# Patient Record
Sex: MALE | Race: OTHER | URBAN - METROPOLITAN AREA
[De-identification: names, ages, dates, MRNs, and addresses within clinical notes are randomized per-mention and may not be internally consistent; named-entity substitution may affect disease eponyms.]

---

## 2021-08-28 ENCOUNTER — TELEPHONE ENCOUNTER (OUTPATIENT)
Dept: URBAN - METROPOLITAN AREA CLINIC 13 | Facility: CLINIC | Age: 86
End: 2021-08-28

## 2021-08-29 ENCOUNTER — TELEPHONE ENCOUNTER (OUTPATIENT)
Dept: URBAN - METROPOLITAN AREA CLINIC 13 | Facility: CLINIC | Age: 86
End: 2021-08-29

## 2022-05-09 ENCOUNTER — OFFICE VISIT (OUTPATIENT)
Dept: URBAN - NONMETROPOLITAN AREA CLINIC 2 | Facility: CLINIC | Age: 87
End: 2022-05-09

## 2022-12-20 ENCOUNTER — OFFICE VISIT (OUTPATIENT)
Dept: URBAN - METROPOLITAN AREA CLINIC 115 | Facility: CLINIC | Age: 87
End: 2022-12-20

## 2022-12-21 ENCOUNTER — OFFICE VISIT (OUTPATIENT)
Dept: URBAN - METROPOLITAN AREA CLINIC 115 | Facility: CLINIC | Age: 87
End: 2022-12-21
Payer: MEDICARE

## 2022-12-21 VITALS
BODY MASS INDEX: 21.34 KG/M2 | WEIGHT: 125 LBS | HEART RATE: 62 BPM | HEIGHT: 64 IN | SYSTOLIC BLOOD PRESSURE: 140 MMHG | TEMPERATURE: 98.2 F | DIASTOLIC BLOOD PRESSURE: 68 MMHG

## 2022-12-21 DIAGNOSIS — R19.7 DIARRHEA, UNSPECIFIED TYPE: ICD-10-CM

## 2022-12-21 PROCEDURE — 99204 OFFICE O/P NEW MOD 45 MIN: CPT | Performed by: INTERNAL MEDICINE

## 2022-12-21 RX ORDER — ACETAMINOPHEN 325 MG/1
1 TABLET AS NEEDED TABLET ORAL
Status: ACTIVE | COMMUNITY

## 2022-12-21 RX ORDER — MULTIVIT-MIN/IRON/FOLIC ACID/K 18-600-40
AS DIRECTED CAPSULE ORAL
Status: ACTIVE | COMMUNITY

## 2022-12-21 RX ORDER — NICOTINE 14MG/24HR
AS DIRECTED PATCH, TRANSDERMAL 24 HOURS TRANSDERMAL
Status: ACTIVE | COMMUNITY

## 2022-12-21 RX ORDER — GABAPENTIN 300 MG/1
1 CAPSULE CAPSULE ORAL ONCE A DAY
Status: ACTIVE | COMMUNITY

## 2022-12-21 RX ORDER — LOPERAMIDE HCL 2 MG
1 CAPSULE AS NEEDED CAPSULE ORAL
Status: ACTIVE | COMMUNITY

## 2022-12-21 NOTE — HPI-TODAY'S VISIT:
Mr. Cabral is a 89year-old male came along with his daughter for evaluation of having severe diarrhea.  Patient stated on his life he has episodes of loose stools often associated with urgency however symptoms has gotten worse in the past few years.  He has seen several GIs in the past and been taking over-the-counter Imodium without any relief.  Patient reports colonoscopy was recommended however because of his age she was hesitant.  Patient also reports having losing weight over the past several years and lost about at least 50 pounds. Patient denies any nausea vomiting blood in the stools.  He reports having occasional abdominal cramping improves after having a bowel movement.  Symptoms Definitely anything greasy and dairy can cause more diarrhea than usual years along with some bloating and discomfort.  Patient was also diagnosed with the neuropathy and he reports having intermittent negative work-up for the neuropathy reports normal B12 levels.  I do not have any records available.  Patient reports having colonoscopy in 2016.

## 2022-12-21 NOTE — PHYSICAL EXAM CONSTITUTIONAL:
thin frial male  , in no acute distress , ambulating without difficulty using cane normal communication ability

## 2023-01-05 ENCOUNTER — LAB OUTSIDE AN ENCOUNTER (OUTPATIENT)
Dept: URBAN - METROPOLITAN AREA CLINIC 82 | Facility: CLINIC | Age: 88
End: 2023-01-05

## 2023-01-05 ENCOUNTER — TELEPHONE ENCOUNTER (OUTPATIENT)
Dept: URBAN - METROPOLITAN AREA CLINIC 115 | Facility: CLINIC | Age: 88
End: 2023-01-05

## 2023-01-11 LAB
CALPROTECTIN, STOOL - QDX: (no result)
FECAL FAT, QUALITATIVE: (no result)
GASTROINTESTINAL PATHOGEN: (no result)
PANCREATICELASTASE ELISA, STOOL: (no result)

## 2023-01-23 ENCOUNTER — OFFICE VISIT (OUTPATIENT)
Dept: URBAN - METROPOLITAN AREA TELEHEALTH 2 | Facility: TELEHEALTH | Age: 88
End: 2023-01-23
Payer: MEDICARE

## 2023-01-23 ENCOUNTER — DASHBOARD ENCOUNTERS (OUTPATIENT)
Age: 88
End: 2023-01-23

## 2023-01-23 ENCOUNTER — TELEPHONE ENCOUNTER (OUTPATIENT)
Dept: URBAN - METROPOLITAN AREA CLINIC 92 | Facility: CLINIC | Age: 88
End: 2023-01-23

## 2023-01-23 VITALS — HEIGHT: 64 IN | BODY MASS INDEX: 22.2 KG/M2 | WEIGHT: 130 LBS

## 2023-01-23 DIAGNOSIS — E73.9 LACTOSE INTOLERANCE: ICD-10-CM

## 2023-01-23 DIAGNOSIS — R19.7 DIARRHEA, UNSPECIFIED TYPE: ICD-10-CM

## 2023-01-23 PROBLEM — 782415009: Status: ACTIVE | Noted: 2023-01-23

## 2023-01-23 PROCEDURE — 99214 OFFICE O/P EST MOD 30 MIN: CPT | Performed by: INTERNAL MEDICINE

## 2023-01-23 RX ORDER — NICOTINE 14MG/24HR
AS DIRECTED PATCH, TRANSDERMAL 24 HOURS TRANSDERMAL
Status: ACTIVE | COMMUNITY

## 2023-01-23 RX ORDER — MULTIVIT-MIN/IRON/FOLIC ACID/K 18-600-40
AS DIRECTED CAPSULE ORAL
Status: ACTIVE | COMMUNITY

## 2023-01-23 RX ORDER — GABAPENTIN 300 MG/1
1 CAPSULE CAPSULE ORAL ONCE A DAY
Status: ACTIVE | COMMUNITY

## 2023-01-23 RX ORDER — ACETAMINOPHEN 325 MG/1
1 TABLET AS NEEDED TABLET ORAL
Status: ACTIVE | COMMUNITY

## 2023-01-23 RX ORDER — PANCRELIPASE LIPASE, PANCRELIPASE AMYLASE, AND PANCRELIPASE PROTEASE 10500; 61500; 35500 [USP'U]/1; [USP'U]/1; [USP'U]/1
WITH MEALS CAPSULE, DELAYED RELEASE ORAL THREE TIMES A DAY
Qty: 100 CAPSULE | Refills: 1 | OUTPATIENT
Start: 2023-01-23 | End: 2023-03-24

## 2023-01-23 RX ORDER — LOPERAMIDE HCL 2 MG
1 CAPSULE AS NEEDED CAPSULE ORAL
Status: ACTIVE | COMMUNITY

## 2023-01-23 NOTE — HPI-TODAY'S VISIT:
Mr. Cabral is a 89year-old male came along with his daughter for evaluation of having severe diarrhea.  Patient stated on his life he has episodes of loose stools often associated with urgency however symptoms has gotten worse in the past few years.  He has seen several GIs in the past and been taking over-the-counter Imodium without any relief.  Patient reports colonoscopy was recommended however because of his age she was hesitant.  Patient also reports having losing weight over the past several years and lost about at least 50 pounds. Patient denies any nausea vomiting blood in the stools.  He reports having occasional abdominal cramping improves after having a bowel movement.  Symptoms Definitely anything greasy and dairy can cause more diarrhea than usual years along with some bloating and discomfort.  Patient was also diagnosed with the neuropathy and he reports having intermittent negative work-up for the neuropathy reports normal B12 levels.  I do not have any records available.  Patient reports having colonoscopy in 2016.  1/23/23 :  Mary Jane was seen along with his daughter over telehealth. He reports intermittent episodes od large volmune stools as well soft stools and also at times with hard stools. He reports bloating and distension. last night for dinner he had chicken rice, cooked clerery and for break fast with scrambled egg, toast and honey, lactose free milk with peanut powder. <-- Click to add NO significant Past Surgical History

## 2023-02-13 ENCOUNTER — TELEPHONE ENCOUNTER (OUTPATIENT)
Dept: URBAN - METROPOLITAN AREA CLINIC 92 | Facility: CLINIC | Age: 88
End: 2023-02-13

## 2023-02-13 PROBLEM — 197125005: Status: ACTIVE | Noted: 2023-02-13

## 2023-02-13 RX ORDER — GABAPENTIN 300 MG/1
1 CAPSULE CAPSULE ORAL ONCE A DAY
Status: ACTIVE | COMMUNITY

## 2023-02-13 RX ORDER — LOPERAMIDE HCL 2 MG
1 CAPSULE AS NEEDED CAPSULE ORAL
Status: ACTIVE | COMMUNITY

## 2023-02-13 RX ORDER — NICOTINE 14MG/24HR
AS DIRECTED PATCH, TRANSDERMAL 24 HOURS TRANSDERMAL
Status: ACTIVE | COMMUNITY

## 2023-02-13 RX ORDER — PANCRELIPASE LIPASE, PANCRELIPASE AMYLASE, AND PANCRELIPASE PROTEASE 10500; 61500; 35500 [USP'U]/1; [USP'U]/1; [USP'U]/1
WITH MEALS CAPSULE, DELAYED RELEASE ORAL THREE TIMES A DAY
Qty: 100 CAPSULE | Refills: 1 | Status: ACTIVE | COMMUNITY
Start: 2023-01-23 | End: 2023-03-24

## 2023-02-13 RX ORDER — ACETAMINOPHEN 325 MG/1
1 TABLET AS NEEDED TABLET ORAL
Status: ACTIVE | COMMUNITY

## 2023-02-13 RX ORDER — MULTIVIT-MIN/IRON/FOLIC ACID/K 18-600-40
AS DIRECTED CAPSULE ORAL
Status: ACTIVE | COMMUNITY

## 2023-02-13 RX ORDER — RIFAXIMIN 550 MG/1
1 TABLET TABLET ORAL THREE TIMES A DAY
Qty: 42 TABLET | Refills: 0 | OUTPATIENT
Start: 2023-02-13 | End: 2023-02-27

## 2023-03-10 ENCOUNTER — TELEPHONE ENCOUNTER (OUTPATIENT)
Dept: URBAN - METROPOLITAN AREA CLINIC 92 | Facility: CLINIC | Age: 88
End: 2023-03-10

## 2023-03-15 ENCOUNTER — TELEPHONE ENCOUNTER (OUTPATIENT)
Dept: URBAN - METROPOLITAN AREA CLINIC 115 | Facility: CLINIC | Age: 88
End: 2023-03-15

## 2023-11-16 ENCOUNTER — TELEPHONE ENCOUNTER (OUTPATIENT)
Dept: URBAN - METROPOLITAN AREA CLINIC 92 | Facility: CLINIC | Age: 88
End: 2023-11-16

## 2023-11-20 ENCOUNTER — OFFICE VISIT (OUTPATIENT)
Dept: URBAN - METROPOLITAN AREA TELEHEALTH 2 | Facility: TELEHEALTH | Age: 88
End: 2023-11-20

## 2023-11-20 RX ORDER — ACETAMINOPHEN 325 MG/1
1 TABLET AS NEEDED TABLET ORAL
Status: ACTIVE | COMMUNITY

## 2023-11-20 RX ORDER — LOPERAMIDE HCL 2 MG
1 CAPSULE AS NEEDED CAPSULE ORAL
Status: ACTIVE | COMMUNITY

## 2023-11-20 RX ORDER — MULTIVIT-MIN/IRON/FOLIC ACID/K 18-600-40
AS DIRECTED CAPSULE ORAL
Status: ACTIVE | COMMUNITY

## 2023-11-20 RX ORDER — GABAPENTIN 300 MG/1
1 CAPSULE CAPSULE ORAL ONCE A DAY
Status: ACTIVE | COMMUNITY

## 2023-11-20 RX ORDER — NICOTINE 14MG/24HR
AS DIRECTED PATCH, TRANSDERMAL 24 HOURS TRANSDERMAL
Status: ACTIVE | COMMUNITY

## 2025-04-09 ENCOUNTER — OFFICE VISIT (OUTPATIENT)
Dept: URBAN - METROPOLITAN AREA CLINIC 115 | Facility: CLINIC | Age: OVER 89
End: 2025-04-09
Payer: MEDICARE

## 2025-04-09 DIAGNOSIS — K58.2 IRRITABLE BOWEL SYNDROME WITH BOTH CONSTIPATION AND DIARRHEA: ICD-10-CM

## 2025-04-09 PROBLEM — 10743008: Status: ACTIVE | Noted: 2025-04-09

## 2025-04-09 PROBLEM — 460671000124103: Status: ACTIVE | Noted: 2025-04-09

## 2025-04-09 PROCEDURE — 99214 OFFICE O/P EST MOD 30 MIN: CPT | Performed by: INTERNAL MEDICINE

## 2025-04-09 RX ORDER — LOPERAMIDE HCL 2 MG
1 CAPSULE AS NEEDED CAPSULE ORAL
Status: ACTIVE | COMMUNITY

## 2025-04-09 RX ORDER — GABAPENTIN 300 MG/1
1 CAPSULE CAPSULE ORAL ONCE A DAY
Status: ACTIVE | COMMUNITY

## 2025-04-09 RX ORDER — MULTIVIT-MIN/IRON/FOLIC ACID/K 18-600-40
AS DIRECTED CAPSULE ORAL
Status: ACTIVE | COMMUNITY

## 2025-04-09 RX ORDER — NICOTINE 14MG/24HR
AS DIRECTED PATCH, TRANSDERMAL 24 HOURS TRANSDERMAL
Status: ACTIVE | COMMUNITY

## 2025-04-09 RX ORDER — ACETAMINOPHEN 325 MG/1
1 TABLET AS NEEDED TABLET ORAL
Status: ACTIVE | COMMUNITY

## 2025-04-09 NOTE — HPI-TODAY'S VISIT:
Mr. Cabral is a 89year-old male came along with his daughter for evaluation of having severe diarrhea.  Patient stated on his life he has episodes of loose stools often associated with urgency however symptoms has gotten worse in the past few years.  He has seen several GIs in the past and been taking over-the-counter Imodium without any relief.  Patient reports colonoscopy was recommended however because of his age she was hesitant.  Patient also reports having losing weight over the past several years and lost about at least 50 pounds. Patient denies any nausea vomiting blood in the stools.  He reports having occasional abdominal cramping improves after having a bowel movement.  Symptoms Definitely anything greasy and dairy can cause more diarrhea than usual years along with some bloating and discomfort.  Patient was also diagnosed with the neuropathy and he reports having intermittent negative work-up for the neuropathy reports normal B12 levels.  I do not have any records available.  Patient reports having colonoscopy in 2016.  1/23/23 :  Mary Jane was seen along with his daughter over telehealth. He reports intermittent episodes od large volmune stools as well soft stools and also at times with hard stools. He reports bloating and distension. last night for dinner he had chicken rice, cooked clerery and for break fast with scrambled egg, toast and honey, lactose free milk with peanut powder.  4/9/25: 89-year-old patient was seen today accompanied by his daughter for ongoing symptoms of diarrhea and incontinence.  Upon further questioning he reports having episodes of constipation as well as diarrhea.  Prior workup for stool for EPI and infection was negative.  Patient reports abdominal bloating and discomfort after eating food.  He does not recall pancreas helped but Xifaxan did not help with his symptoms.  Patient reports some weight loss however he denies any poor appetite except for when he has episodes of bloating.  Patient reports having episodes of soft stools for which he takes Pepto-Bismol and Imodium.  He also admits for having constipation after episodes of diarrhea.  [facility] MiraLAX which caused him extreme diarrhea.  Daughter made him he stays with his kids denies any rectal bleeding prior stool workup was and blood work was all reviewed.

## 2025-04-19 LAB
CALPROTECTIN, FECAL: 15
CLOSTRIDIUM DIFFICILE TOXINB,QL REAL TIME PCR: NOT DETECTED
FECAL FAT, QUALITATIVE: NORMAL
PANCREATIC ELASTASE, FECAL: 626